# Patient Record
Sex: MALE | Race: OTHER | NOT HISPANIC OR LATINO
[De-identification: names, ages, dates, MRNs, and addresses within clinical notes are randomized per-mention and may not be internally consistent; named-entity substitution may affect disease eponyms.]

---

## 2020-12-09 ENCOUNTER — NON-APPOINTMENT (OUTPATIENT)
Age: 35
End: 2020-12-09

## 2020-12-11 ENCOUNTER — NON-APPOINTMENT (OUTPATIENT)
Age: 35
End: 2020-12-11

## 2020-12-11 ENCOUNTER — APPOINTMENT (OUTPATIENT)
Dept: CARDIOLOGY | Facility: CLINIC | Age: 35
End: 2020-12-11
Payer: COMMERCIAL

## 2020-12-11 VITALS
OXYGEN SATURATION: 100 % | SYSTOLIC BLOOD PRESSURE: 138 MMHG | DIASTOLIC BLOOD PRESSURE: 84 MMHG | TEMPERATURE: 99 F | HEART RATE: 84 BPM | BODY MASS INDEX: 25.97 KG/M2 | WEIGHT: 165.44 LBS | HEIGHT: 67 IN

## 2020-12-11 DIAGNOSIS — Z78.9 OTHER SPECIFIED HEALTH STATUS: ICD-10-CM

## 2020-12-11 DIAGNOSIS — Z82.49 FAMILY HISTORY OF ISCHEMIC HEART DISEASE AND OTHER DISEASES OF THE CIRCULATORY SYSTEM: ICD-10-CM

## 2020-12-11 DIAGNOSIS — Z80.1 FAMILY HISTORY OF MALIGNANT NEOPLASM OF TRACHEA, BRONCHUS AND LUNG: ICD-10-CM

## 2020-12-11 DIAGNOSIS — Z00.00 ENCOUNTER FOR GENERAL ADULT MEDICAL EXAMINATION W/OUT ABNORMAL FINDINGS: ICD-10-CM

## 2020-12-11 DIAGNOSIS — U07.1 COVID-19: ICD-10-CM

## 2020-12-11 PROCEDURE — 99072 ADDL SUPL MATRL&STAF TM PHE: CPT

## 2020-12-11 PROCEDURE — 0296T: CPT

## 2020-12-11 PROCEDURE — 93000 ELECTROCARDIOGRAM COMPLETE: CPT | Mod: 59

## 2020-12-11 PROCEDURE — 99205 OFFICE O/P NEW HI 60 MIN: CPT

## 2020-12-12 ENCOUNTER — NON-APPOINTMENT (OUTPATIENT)
Age: 35
End: 2020-12-12

## 2020-12-12 PROBLEM — U07.1 COVID-19 VIRUS INFECTION: Status: RESOLVED | Noted: 2020-12-11 | Resolved: 2020-12-12

## 2020-12-12 NOTE — HISTORY OF PRESENT ILLNESS
[FreeTextEntry1] : 35-year-old man\par Cardiology consultation  requested  by  Dr. Lewis  because of palpitations\par \par Mr. Yeager has no known heart disease. There is no history of a myocardial infarction angina congestive heart failure or rhythm disturbance. Over the last several weeks he has  experienced  palpitations described as a "pounding" of his heart beat in the anterior chest. The symptoms occur randomly and the heart rates are often normal.  No associated diaphoresis nausea or dyspnea. No exertional chest pain. No syncope.\par \par In 3/19 he was diagnosed as having  Covid 19 infection. He was not hospitalized but treated with a short term course of steroids and antibiotics \par \par There is no history of diabetes hypertension smoking or hyperlipidemia. There is no family history of myocardial infarction or sudden death.\par \par Bryan will drink 4-5 beers on a weekend. He used cocaine and marijuana 12 years ago but not since that time.\par \par \par Mr. Yeager presents today for cardiovascular  evaluation.

## 2020-12-12 NOTE — DISCUSSION/SUMMARY
[FreeTextEntry1] : The working diagnosis is palpitations secondary to sinus tachycardia. The history is consistent with this diagnosis. The differential diagnosis would include an as yet undetected atrial arrhythmia.  The resting 12/11/20 electrocardiogram shows sinus rhythm with a short CT interval and significant sinus arrhythmia. The presence of a short CT interval even in the absence of a delta wave raise concern for preexcitation.  In the setting of presumed normal left ventricular systolic function the risk for the  development of a malignant ventricular arrhythmia is low . The possibility of latent  effect of Covid 19 infection cannot be completely eliminated as a possible contributing factor. . An electrocardiogram during symptoms would be most helpful for diagnosis. A noninvasive cardiac evaluation will be helpful for management decisions.\par \par I have recommended the following\par \par a.Zio patch/2 week mobile telemetry study\par b. Echocardiogram\par \par \par \par The diagnosis, prognosis, risks, options and alternatives were explained at length to the patient. All questions were answered. Issues discussed included palpitations arrhythmias  Covid 19 cardiovascular complications and various noninvasive cardiac studies.

## 2020-12-22 PROCEDURE — 99072 ADDL SUPL MATRL&STAF TM PHE: CPT

## 2020-12-22 PROCEDURE — 0298T: CPT

## 2021-01-03 ENCOUNTER — NON-APPOINTMENT (OUTPATIENT)
Age: 36
End: 2021-01-03

## 2021-02-02 ENCOUNTER — APPOINTMENT (OUTPATIENT)
Dept: CARDIOLOGY | Facility: CLINIC | Age: 36
End: 2021-02-02

## 2021-02-08 ENCOUNTER — RESULT REVIEW (OUTPATIENT)
Age: 36
End: 2021-02-08

## 2021-02-10 ENCOUNTER — NON-APPOINTMENT (OUTPATIENT)
Age: 36
End: 2021-02-10

## 2022-02-15 ENCOUNTER — APPOINTMENT (OUTPATIENT)
Dept: CARDIOLOGY | Facility: CLINIC | Age: 37
End: 2022-02-15
Payer: COMMERCIAL

## 2022-02-15 VITALS
OXYGEN SATURATION: 99 % | HEIGHT: 67 IN | WEIGHT: 174 LBS | SYSTOLIC BLOOD PRESSURE: 145 MMHG | DIASTOLIC BLOOD PRESSURE: 96 MMHG | HEART RATE: 88 BPM | BODY MASS INDEX: 27.31 KG/M2 | TEMPERATURE: 98.6 F

## 2022-02-15 PROCEDURE — 99213 OFFICE O/P EST LOW 20 MIN: CPT

## 2022-02-15 PROCEDURE — 93000 ELECTROCARDIOGRAM COMPLETE: CPT

## 2022-02-19 ENCOUNTER — NON-APPOINTMENT (OUTPATIENT)
Age: 37
End: 2022-02-19

## 2022-02-19 NOTE — HISTORY OF PRESENT ILLNESS
[FreeTextEntry1] : 36-year-old man\par Routine followup\par "I feel that OK." Bryan denies chest pain,  shortness of breath or syncope.  Occasional palpitations are not severe progressive with different from in the past.

## 2022-02-19 NOTE — PHYSICAL EXAM
[Normal Conjunctiva] : the conjunctiva exhibited no abnormalities [Heart Rate And Rhythm] : heart rate and rhythm were normal [Heart Sounds] : normal S1 and S2 [Auscultation Breath Sounds / Voice Sounds] : lungs were clear to auscultation bilaterally [Abdomen Soft] : soft [Bowel Sounds] : normal bowel sounds [Abdomen Tenderness] : non-tender [Abnormal Walk] : normal gait [] : no rash [Affect] : the affect was normal [FreeTextEntry1] : pleasant

## 2022-02-19 NOTE — DISCUSSION/SUMMARY
[FreeTextEntry1] : \par Elevated blood pressure with no prior history of hypertension\par There is no previous history of hypertension. Elevation of blood pressure on initial examination today O. (145/96 mmHg) is attributed to a white coat effect. However there is a strong family history of hypertension in both his mother and father. The most recent guidelines provided by the American Heart Association/American College of cardiology have lowered the threshold for initiation or intensification of antihypertensive medical therapy.   Followup blood pressure checks will be helpful to determine requirements for antihypertensive medical intervention. Nonpharmacological therapy, specifically diet and exercise are emphasized  as  major aspects of treatment.\par \par I have recommended the following:\par a. Low salt diet. Regular aerobic exercise\par b. Home blood pressure monitoring\par c. Antihypertensive medical intervention dictated by the above and the patient's clinical course\par \par \par \par Palpitations\par The working diagnosis is palpitations secondary to sinus tachycardia. The history is consistent with this diagnosis. The differential diagnosis would include an as yet undetected atrial arrhythmia.  The resting 2/15/22 electrocardiogram shows sinus rhythm with a short UT interval and significant sinus arrhythmia. The presence of a short UT interval even in the absence of a delta wave raise concern for preexcitation.\par A 12/20  Zio patch 11 day mobile telemetry study revealed sinus rhythm /minute. 12 beat ventricular tachycardia 125/minute was seen. No sustained arrhythmia was recorded. There was no symptom correlation.  In the setting of normal left ventricular systolic function  (2/21 echocardiogram left ventricular ejection fraction 61%)  the risk for further development of a sustained malignant ventricular arrhythmia is low.\par \par I have recommended the following\par a. No further cardiac testing for this problem at this time\par \par \par \par The diagnosis, prognosis, risks, options and alternatives were explained at length to the patient. All questions were answered. Issues discussed included  hypertension  palpitations arrhythmias  Covid 19 cardiovascular complications  anti hypertensive medical therapy  diet exercise  and various noninvasive cardiac studies.\par \par Counseling and/or coordination of care\par Time was a significant factor for this patient encounter. Total time spent with patient was  25 minutes. Greater than 50% of the time was devoted to counseling and/or coordination of  care.

## 2023-01-31 ENCOUNTER — NON-APPOINTMENT (OUTPATIENT)
Age: 38
End: 2023-01-31

## 2023-02-01 ENCOUNTER — NON-APPOINTMENT (OUTPATIENT)
Age: 38
End: 2023-02-01

## 2023-02-01 ENCOUNTER — APPOINTMENT (OUTPATIENT)
Dept: CARDIOLOGY | Facility: CLINIC | Age: 38
End: 2023-02-01
Payer: COMMERCIAL

## 2023-02-01 VITALS
OXYGEN SATURATION: 100 % | BODY MASS INDEX: 26.53 KG/M2 | HEIGHT: 67 IN | WEIGHT: 169 LBS | HEART RATE: 64 BPM | DIASTOLIC BLOOD PRESSURE: 93 MMHG | SYSTOLIC BLOOD PRESSURE: 140 MMHG

## 2023-02-01 PROCEDURE — 93000 ELECTROCARDIOGRAM COMPLETE: CPT

## 2023-02-01 PROCEDURE — 99213 OFFICE O/P EST LOW 20 MIN: CPT | Mod: 25

## 2023-02-04 NOTE — PHYSICAL EXAM
[Normal Conjunctiva] : the conjunctiva exhibited no abnormalities [Heart Rate And Rhythm] : heart rate and rhythm were normal [Heart Sounds] : normal S1 and S2 [Auscultation Breath Sounds / Voice Sounds] : lungs were clear to auscultation bilaterally [Bowel Sounds] : normal bowel sounds [Abdomen Soft] : soft [Abdomen Tenderness] : non-tender [Abnormal Walk] : normal gait [] : no rash [Affect] : the affect was normal [FreeTextEntry1] : No peripheral edema. Dorsalis pedis pulses +2 bilaterally. Feet warm and well-perfused. No ulcerations.

## 2023-02-04 NOTE — DISCUSSION/SUMMARY
[FreeTextEntry1] : \par Elevated blood pressure with no prior history of hypertension\par There is no previous history of hypertension. Elevation of blood pressure on initial examination today . (145/93 mmHg) may in part    be   attributed to a white coat effect. However there is a strong family history of hypertension in both his mother and father. The most recent guidelines provided by the American Heart Association/American College of cardiology have lowered the threshold for initiation or intensification of antihypertensive medical therapy.   The main clinical decision involves whether or not to institute antihypertensive medical therapy. . Nonpharmacological therapy, specifically diet and exercise are emphasized  as  major aspects of treatment.\par \par I have recommended the following:\par a. Low salt diet. Regular aerobic exercise\par b.  24-hour ambulatory blood pressure monitor study\par c. Home blood pressure monitoring\par d . Antihypertensive medical intervention dictated by the above and the patient's clinical course\par \par \par \par \par The diagnosis, prognosis, risks, options and alternatives were explained at length to the patient. All questions were answered. Issues discussed included  hypertension  palpitations arrhythmias  Covid 19 cardiovascular complications  anti hypertensive medical therapy  diet exercise  and various noninvasive cardiac studies.\par \par Counseling and/or coordination of care\par Time was a significant factor for this patient encounter. Total time spent with patient was  25 minutes. Greater than 50% of the time was devoted to counseling and/or coordination of  care.

## 2023-02-04 NOTE — REVIEW OF SYSTEMS
[Feeling Fatigued] : feeling fatigued [Joint Pain] : joint pain [Negative] : Heme/Lymph [FreeTextEntry5] : see  history of present illness

## 2023-02-04 NOTE — HISTORY OF PRESENT ILLNESS
[FreeTextEntry1] : 37-year-old man\par Routine follow-up\par Bryan states that he feels well ..  He is physically active exercising regularly without chest pain shortness of breath  or syncope.  Prior symptoms of palpitations have abated. \par  However blood pressure levels have been high normal to slightly elevated typically about 140/90 mmHg.  He is taking no medications..

## 2023-02-15 ENCOUNTER — RESULT REVIEW (OUTPATIENT)
Age: 38
End: 2023-02-15

## 2023-02-15 ENCOUNTER — RESULT CHARGE (OUTPATIENT)
Age: 38
End: 2023-02-15

## 2023-04-02 ENCOUNTER — NON-APPOINTMENT (OUTPATIENT)
Age: 38
End: 2023-04-02

## 2023-04-02 DIAGNOSIS — Z86.79 PERSONAL HISTORY OF OTHER DISEASES OF THE CIRCULATORY SYSTEM: ICD-10-CM

## 2023-04-02 RX ORDER — LISINOPRIL 10 MG/1
10 TABLET ORAL
Qty: 30 | Refills: 3 | Status: ACTIVE | COMMUNITY
Start: 2023-04-02 | End: 1900-01-01

## 2023-05-31 ENCOUNTER — NON-APPOINTMENT (OUTPATIENT)
Age: 38
End: 2023-05-31

## 2023-06-01 ENCOUNTER — APPOINTMENT (OUTPATIENT)
Dept: CARDIOLOGY | Facility: CLINIC | Age: 38
End: 2023-06-01
Payer: COMMERCIAL

## 2023-06-01 PROCEDURE — 36415 COLL VENOUS BLD VENIPUNCTURE: CPT

## 2023-06-02 ENCOUNTER — NON-APPOINTMENT (OUTPATIENT)
Age: 38
End: 2023-06-02

## 2023-06-02 LAB
ANION GAP SERPL CALC-SCNC: 13 MMOL/L
BUN SERPL-MCNC: 15 MG/DL
CALCIUM SERPL-MCNC: 9.1 MG/DL
CHLORIDE SERPL-SCNC: 105 MMOL/L
CHOLEST SERPL-MCNC: 176 MG/DL
CO2 SERPL-SCNC: 23 MMOL/L
CREAT SERPL-MCNC: 1.12 MG/DL
EGFR: 86 ML/MIN/1.73M2
GLUCOSE SERPL-MCNC: 101 MG/DL
HCT VFR BLD CALC: 42.1 %
HDLC SERPL-MCNC: 56 MG/DL
HGB BLD-MCNC: 13 G/DL
LDLC SERPL CALC-MCNC: 98 MG/DL
MCHC RBC-ENTMCNC: 30.9 GM/DL
MCHC RBC-ENTMCNC: 31.1 PG
MCV RBC AUTO: 100.7 FL
NONHDLC SERPL-MCNC: 119 MG/DL
PLATELET # BLD AUTO: 230 K/UL
POTASSIUM SERPL-SCNC: 4.3 MMOL/L
RBC # BLD: 4.18 M/UL
RBC # FLD: 13 %
SODIUM SERPL-SCNC: 140 MMOL/L
TRIGL SERPL-MCNC: 105 MG/DL
WBC # FLD AUTO: 7.45 K/UL

## 2023-07-07 ENCOUNTER — APPOINTMENT (OUTPATIENT)
Dept: CARDIOLOGY | Facility: CLINIC | Age: 38
End: 2023-07-07
Payer: COMMERCIAL

## 2023-07-07 VITALS
SYSTOLIC BLOOD PRESSURE: 131 MMHG | BODY MASS INDEX: 26.68 KG/M2 | HEART RATE: 76 BPM | OXYGEN SATURATION: 100 % | WEIGHT: 170 LBS | HEIGHT: 67 IN | DIASTOLIC BLOOD PRESSURE: 85 MMHG

## 2023-07-07 DIAGNOSIS — Z87.898 PERSONAL HISTORY OF OTHER SPECIFIED CONDITIONS: ICD-10-CM

## 2023-07-07 DIAGNOSIS — I10 ESSENTIAL (PRIMARY) HYPERTENSION: ICD-10-CM

## 2023-07-07 DIAGNOSIS — R73.9 HYPERGLYCEMIA, UNSPECIFIED: ICD-10-CM

## 2023-07-07 PROCEDURE — 99214 OFFICE O/P EST MOD 30 MIN: CPT

## 2023-07-09 PROBLEM — I10 HYPERTENSION: Status: ACTIVE | Noted: 2023-04-02

## 2023-07-09 PROBLEM — R73.9 HYPERGLYCEMIA: Status: ACTIVE | Noted: 2023-07-09

## 2023-07-09 PROBLEM — Z87.898 HISTORY OF PALPITATIONS: Status: RESOLVED | Noted: 2020-12-11 | Resolved: 2021-01-03

## 2023-07-09 NOTE — DISCUSSION/SUMMARY
[FreeTextEntry1] : Hypertension\par The working diagnosis is essential hypertension.  A    2/23      24-hour ambulatory blood pressure study revealed stage II hypertension with an average blood pressure 144/94 mmHg prior to medical intervention.  There has been a favorable response to the present medical therapy.  The dose of lisinopril may be increased if required to maintain optimal levels.\par . Nonpharmacological therapy, specifically diet and exercise are emphasized  as  major aspects of treatment.\par \par I have recommended the following:\par a. Low salt low carbohydrate heart healthy diet. Regular aerobic exercise\par b.  Continue the present medical regimen \par c. Home blood pressure monitoring\par d .  Increase lisinopril dose if required to maintain optimal levels as discussed above \par \par \par \par Hyperglycemia\par The 6/23 glucose level of 101 may be representative of  prediabetes.  This represents a new problem.  Nonpharmacological therapy, specifically diet and exercise are emphasized as major aspects of treatment.\par \par I have recommended the following\par a.  Low carbohydrate low-fat heart healthy diet.  Regular aerobic exercise\par b.  Laboratory studies including hemoglobin A1c and urinalysis through primary care Dr. Lewis\par \par \par \par \par Palpitations\par The working diagnosis is palpitations secondary to sinus tachycardia.  A 12/20 Zio patch 11-day mobile telemetry study revealed sinus rhythm 80–180/minute.  12 beat ventricular tachycardia 123/minute was reported without any symptom correlation.  In the setting of normal left ventricular systolic function (2/21 echocardiogram left ventricular ejection fraction 61%) the risk for the development of a malignant ventricular arrhythmia is low.\par \par I have recommended the following\par a.  Reassurance\par b.  No further cardiac testing for this problem at this time.\par \par \par \par The diagnosis, prognosis, risks, options and alternatives were explained at length to the patient. All questions were answered. Issues discussed included hyperglycemia prediabetes hypertension  palpitations arrhythmias  Covid 19 cardiovascular complications  anti hypertensive medical therapy  diet exercise  and various noninvasive cardiac studies.\par \par Counseling and/or coordination of care\par Time was a significant factor for this patient encounter. Total time spent with patient was  30 minutes. Greater than 50% of the time was devoted to counseling and/or coordination of  care.

## 2023-07-09 NOTE — REVIEW OF SYSTEMS
[Joint Pain] : joint pain [Negative] : Psychiatric [FreeTextEntry5] : See history of present illness

## 2023-07-09 NOTE — HISTORY OF PRESENT ILLNESS
[FreeTextEntry1] : 38-year-old male\par Routine follow-up\par Blood pressure levels have been normal while taking the present medical regimen of lisinopril 10 mg/day.  He is physically active exercising regularly without restriction or limitation.  Palpitations have diminished in frequency and severity.

## 2023-07-16 ENCOUNTER — TRANSCRIPTION ENCOUNTER (OUTPATIENT)
Age: 38
End: 2023-07-16

## 2024-01-22 ENCOUNTER — APPOINTMENT (OUTPATIENT)
Dept: GASTROENTEROLOGY | Facility: CLINIC | Age: 39
End: 2024-01-22
Payer: COMMERCIAL

## 2024-01-22 VITALS
SYSTOLIC BLOOD PRESSURE: 130 MMHG | BODY MASS INDEX: 26.68 KG/M2 | DIASTOLIC BLOOD PRESSURE: 80 MMHG | WEIGHT: 170 LBS | HEIGHT: 67 IN

## 2024-01-22 DIAGNOSIS — K62.5 HEMORRHAGE OF ANUS AND RECTUM: ICD-10-CM

## 2024-01-22 PROCEDURE — 99204 OFFICE O/P NEW MOD 45 MIN: CPT

## 2024-01-22 RX ORDER — HYDROCORTISONE ACETATE 25 MG/1
25 SUPPOSITORY RECTAL TWICE DAILY
Qty: 28 | Refills: 0 | Status: ACTIVE | COMMUNITY
Start: 2024-01-22 | End: 1900-01-01

## 2024-01-22 NOTE — ASSESSMENT
[FreeTextEntry1] : 1. No external hemorrhoids nor fissures ect visualized. Neg guaiac.  2. ? if episodes are R/T internal hemorrhoids or hard stool bypassing the rectum; therefore, recommended high fiber diet, high water intake, hydrocortisone supp as needed no more than 14 days long BID. avoiding long periods of time sitting, wearing loose fitting clothing ect.  3. If symptoms of rectal bleeding continue after above consider colonoscopy for diagnostic purposes: R/O malignant polyp, IBD, colon cancer, bleeding IH ect.   Colonoscopy prep with: Duclolax/miralax/gatorade and clear liquid diet day before colonoscopy.   Risks/ Benefits/ Alternatives to procedure discussed; including but not limited to bleeding / perforation / infection / anesthesia complication / missed polyp or lesion explained to the patient. Risk of not doing procedure includes but is not limited to missed or delayed diagnosis of gastrointestinal pathology.     The patient expressed understanding and a desire to proceed with the procedure.

## 2024-01-22 NOTE — PHYSICAL EXAM
[Alert] : alert [Normal Voice/Communication] : normal voice/communication [Healthy Appearing] : healthy appearing [No Acute Distress] : no acute distress [Sclera] : the sclera and conjunctiva were normal [Hearing Threshold Finger Rub Not [place]] : hearing was normal [Normal Lips/Gums] : the lips and gums were normal [Oropharynx] : the oropharynx was normal [Normal Appearance] : the appearance of the neck was normal [No Neck Mass] : no neck mass was observed [No Respiratory Distress] : no respiratory distress [No Acc Muscle Use] : no accessory muscle use [Respiration, Rhythm And Depth] : normal respiratory rhythm and effort [Auscultation Breath Sounds / Voice Sounds] : lungs were clear to auscultation bilaterally [Heart Rate And Rhythm] : heart rate was normal and rhythm regular [Murmurs] : no murmurs [Normal S1, S2] : normal S1 and S2 [Abdomen Tenderness] : non-tender [Bowel Sounds] : normal bowel sounds [No Masses] : no abdominal mass palpated [Abdomen Soft] : soft [] : no hepatosplenomegaly [Normal Sphincter Tone] : normal sphincter tone [No Rectal Mass] : no rectal mass [No External Hemorrhoid] : no external hemorrhoids [Oriented To Time, Place, And Person] : oriented to person, place, and time [Occult Blood] : negative occult blood [de-identified] : IGNACIO Gross during examination

## 2024-01-22 NOTE — HISTORY OF PRESENT ILLNESS
[FreeTextEntry1] : Patient is a pleasant 37 yo M with PMHX of DM-2, HTN  Presents to the office today with C/O rectal bleeding x 2 years. Reports that episodes occur appox 1-2 x a month where he notices drops of blood in the toilet bowel during defecation and when wiping.  Last episode was in December 2023 during the holidays. Associated with rectal itching. No pain. Does admit to pushing and straining frequently.  Unaware of any specific food triggers. Has not trial any otc medication for these symptoms.  Follows a diet high in water intake, could eat more fiber.  Has never had a colonoscopy. No family hx of colon cancer BM's occur daily; brown colored.   Denies Fever, chills, unint wt loss/ wt gain, dizziness, headaches, chest pain, SOB, Chronic cough, reflux, heartburn, dysphagia, early satiety, food avoidance, abd pain, bloating, cramping, N/V/C/D, black stool, Jaundice, steatorrhea, sick contacts nor travel.  Fam Hx: Denies any GI malignancies in the family. Social Hx: Social driker on the weekends, denies any smoking not illicit drug use.

## 2024-04-15 ENCOUNTER — APPOINTMENT (OUTPATIENT)
Dept: GASTROENTEROLOGY | Facility: HOSPITAL | Age: 39
End: 2024-04-15